# Patient Record
Sex: MALE | Race: WHITE | NOT HISPANIC OR LATINO
[De-identification: names, ages, dates, MRNs, and addresses within clinical notes are randomized per-mention and may not be internally consistent; named-entity substitution may affect disease eponyms.]

---

## 2020-12-21 ENCOUNTER — APPOINTMENT (OUTPATIENT)
Dept: SURGERY | Facility: CLINIC | Age: 70
End: 2020-12-21
Payer: COMMERCIAL

## 2020-12-21 VITALS
BODY MASS INDEX: 31.84 KG/M2 | SYSTOLIC BLOOD PRESSURE: 130 MMHG | TEMPERATURE: 98 F | HEART RATE: 78 BPM | HEIGHT: 69 IN | OXYGEN SATURATION: 99 % | DIASTOLIC BLOOD PRESSURE: 80 MMHG | WEIGHT: 215 LBS

## 2020-12-21 DIAGNOSIS — Z78.9 OTHER SPECIFIED HEALTH STATUS: ICD-10-CM

## 2020-12-21 DIAGNOSIS — Z85.528 PERSONAL HISTORY OF OTHER MALIGNANT NEOPLASM OF KIDNEY: ICD-10-CM

## 2020-12-21 PROBLEM — Z00.00 ENCOUNTER FOR PREVENTIVE HEALTH EXAMINATION: Status: ACTIVE | Noted: 2020-12-21

## 2020-12-21 PROCEDURE — 99203 OFFICE O/P NEW LOW 30 MIN: CPT

## 2020-12-21 NOTE — CONSULT LETTER
[Dear  ___] : Dear  [unfilled], [Consult Letter:] : I had the pleasure of evaluating your patient, [unfilled]. [Please see my note below.] : Please see my note below. [Consult Closing:] : Thank you very much for allowing me to participate in the care of this patient.  If you have any questions, please do not hesitate to contact me. [Sincerely,] : Sincerely, [FreeTextEntry3] : Rylan Gomez MD, FACS

## 2020-12-21 NOTE — PLAN
[FreeTextEntry1] : Mr. PERDOMO  was told significance of findings, options, risks and benefits were explained.  .  All surgical options were discussed including non-surgical treatments.  patient was advised to follow up with his surgeon . \par Patient advised to seek immediate medical attention with any acute change in symptoms or with the development of any new or worsening symptoms.  Patient's questions and concerns addressed to patient's satisfaction, and patient verbalized an understanding of the information discussed.\par \par

## 2020-12-21 NOTE — ASSESSMENT
[FreeTextEntry1] : Impression: right inguinal hernia   No evidence of hernia  recurrence.     right testicle  positioned   in his upper part of the scrotum

## 2020-12-21 NOTE — HISTORY OF PRESENT ILLNESS
[de-identified] : Mr. NIKO PERDOMO is  a 70 year old male who was referred by Dr. Rajiv Sullivan with the chief complaint of having pain   in his Right  groin.  he is here for a second opinion.  Patient states he had a RIH repair on December 2 and since than his testicle is in his upper part of the scrotum .     He denies any fever or  night sweats. Appetite is good and weight is stable.  no problems with urination. Patient reports good bowel movements and appetite. \par  \par  \par

## 2020-12-21 NOTE — PHYSICAL EXAM
[Alert] : alert [Oriented to Person] : oriented to person [Oriented to Place] : oriented to place [Oriented to Time] : oriented to time [Calm] : calm [de-identified] : He  is alert, well-groomed, and cheerful.\par   [de-identified] : anicteric.  Nasal mucosa pink, septum midline. Oral mucosa pink.  Tongue midline, Pharynx without exudates.\par   [de-identified] : Neck supple. Trachea midline. Thyroid isthmus barely palpable, lobes not felt.\par   [de-identified] :   No evidence of hernia  recurrence. wound well healed.   No penile discharge or lesions.  No scrotal swelling or discoloration. Testes descended on the left side and  right testicle  positioned   in his upper part of the scrotum  , smooth, without masses. Epididymis non-tender.

## 2023-12-11 ENCOUNTER — APPOINTMENT (OUTPATIENT)
Dept: SURGERY | Facility: CLINIC | Age: 73
End: 2023-12-11
Payer: MEDICARE

## 2023-12-11 VITALS
BODY MASS INDEX: 32.5 KG/M2 | SYSTOLIC BLOOD PRESSURE: 128 MMHG | HEIGHT: 67.72 IN | HEART RATE: 76 BPM | WEIGHT: 212 LBS | OXYGEN SATURATION: 94 % | DIASTOLIC BLOOD PRESSURE: 72 MMHG

## 2023-12-11 DIAGNOSIS — Z87.891 PERSONAL HISTORY OF NICOTINE DEPENDENCE: ICD-10-CM

## 2023-12-11 DIAGNOSIS — I10 ESSENTIAL (PRIMARY) HYPERTENSION: ICD-10-CM

## 2023-12-11 DIAGNOSIS — Z95.5 PRESENCE OF CORONARY ANGIOPLASTY IMPLANT AND GRAFT: ICD-10-CM

## 2023-12-11 DIAGNOSIS — Z85.118 PERSONAL HISTORY OF OTHER MALIGNANT NEOPLASM OF BRONCHUS AND LUNG: ICD-10-CM

## 2023-12-11 DIAGNOSIS — M79.89 OTHER SPECIFIED SOFT TISSUE DISORDERS: ICD-10-CM

## 2023-12-11 PROCEDURE — 99214 OFFICE O/P EST MOD 30 MIN: CPT

## 2023-12-11 RX ORDER — ESCITALOPRAM OXALATE 10 MG/1
10 TABLET ORAL
Qty: 60 | Refills: 0 | Status: ACTIVE | COMMUNITY
Start: 2023-12-02

## 2023-12-11 RX ORDER — MEMANTINE HYDROCHLORIDE AND DONEPEZIL HYDROCHLORIDE 14; 10 MG/1; MG/1
14-10 CAPSULE ORAL
Qty: 30 | Refills: 0 | Status: ACTIVE | COMMUNITY
Start: 2023-10-19

## 2023-12-11 RX ORDER — ATORVASTATIN CALCIUM 80 MG/1
80 TABLET, FILM COATED ORAL
Qty: 90 | Refills: 0 | Status: ACTIVE | COMMUNITY
Start: 2023-10-19

## 2023-12-11 RX ORDER — ASPIRIN 325 MG/1
TABLET, FILM COATED ORAL
Refills: 0 | Status: ACTIVE | COMMUNITY

## 2023-12-11 RX ORDER — PANTOPRAZOLE 40 MG/1
40 TABLET, DELAYED RELEASE ORAL
Qty: 90 | Refills: 0 | Status: ACTIVE | COMMUNITY
Start: 2023-10-19

## 2023-12-11 RX ORDER — ZOLPIDEM TARTRATE 10 MG/1
10 TABLET ORAL
Qty: 30 | Refills: 0 | Status: ACTIVE | COMMUNITY
Start: 2023-05-10

## 2023-12-11 RX ORDER — CIPROFLOXACIN HYDROCHLORIDE 500 MG/1
500 TABLET, FILM COATED ORAL
Qty: 28 | Refills: 0 | Status: ACTIVE | COMMUNITY
Start: 2023-10-04

## 2023-12-11 RX ORDER — ISOSORBIDE MONONITRATE 30 MG/1
30 TABLET, EXTENDED RELEASE ORAL
Qty: 180 | Refills: 0 | Status: ACTIVE | COMMUNITY
Start: 2023-06-20

## 2023-12-11 RX ORDER — POLYETHYLENE GLYCOL 3350 17 G/17G
17 POWDER, FOR SOLUTION ORAL
Qty: 238 | Refills: 0 | Status: ACTIVE | COMMUNITY
Start: 2023-07-26

## 2023-12-11 RX ORDER — LEVOTHYROXINE SODIUM 0.03 MG/1
25 TABLET ORAL
Qty: 30 | Refills: 0 | Status: ACTIVE | COMMUNITY
Start: 2023-06-12

## 2023-12-11 RX ORDER — BENZONATATE 200 MG/1
200 CAPSULE ORAL
Qty: 30 | Refills: 0 | Status: ACTIVE | COMMUNITY
Start: 2023-12-06

## 2023-12-11 RX ORDER — PANCRELIPASE 120000; 24000; 76000 [USP'U]/1; [USP'U]/1; [USP'U]/1
24000-76000 CAPSULE, DELAYED RELEASE PELLETS ORAL
Qty: 90 | Refills: 0 | Status: ACTIVE | COMMUNITY
Start: 2023-11-13

## 2023-12-11 RX ORDER — LINACLOTIDE 145 UG/1
145 CAPSULE, GELATIN COATED ORAL
Qty: 30 | Refills: 0 | Status: ACTIVE | COMMUNITY
Start: 2023-09-12

## 2023-12-11 RX ORDER — TAMSULOSIN HYDROCHLORIDE 0.4 MG/1
0.4 CAPSULE ORAL
Qty: 30 | Refills: 0 | Status: ACTIVE | COMMUNITY
Start: 2023-12-02

## 2023-12-11 RX ORDER — METOPROLOL TARTRATE 25 MG/1
25 TABLET, FILM COATED ORAL
Qty: 90 | Refills: 0 | Status: ACTIVE | COMMUNITY
Start: 2023-10-19

## 2023-12-11 RX ORDER — CLOPIDOGREL BISULFATE 75 MG/1
75 TABLET, FILM COATED ORAL
Qty: 90 | Refills: 0 | Status: ACTIVE | COMMUNITY
Start: 2023-10-19

## 2023-12-11 RX ORDER — VIBEGRON 75 MG/1
75 TABLET, FILM COATED ORAL
Qty: 14 | Refills: 0 | Status: ACTIVE | COMMUNITY
Start: 2023-10-04

## 2024-01-18 ENCOUNTER — RESULT REVIEW (OUTPATIENT)
Age: 74
End: 2024-01-18

## 2024-01-18 ENCOUNTER — OUTPATIENT (OUTPATIENT)
Dept: OUTPATIENT SERVICES | Facility: HOSPITAL | Age: 74
LOS: 1 days | End: 2024-01-18
Payer: MEDICARE

## 2024-01-18 VITALS
TEMPERATURE: 98 F | HEART RATE: 75 BPM | SYSTOLIC BLOOD PRESSURE: 123 MMHG | WEIGHT: 209 LBS | HEIGHT: 68 IN | DIASTOLIC BLOOD PRESSURE: 65 MMHG | OXYGEN SATURATION: 98 % | RESPIRATION RATE: 18 BRPM

## 2024-01-18 DIAGNOSIS — Z95.5 PRESENCE OF CORONARY ANGIOPLASTY IMPLANT AND GRAFT: ICD-10-CM

## 2024-01-18 DIAGNOSIS — E78.5 HYPERLIPIDEMIA, UNSPECIFIED: ICD-10-CM

## 2024-01-18 DIAGNOSIS — F03.90 UNSPECIFIED DEMENTIA WITHOUT BEHAVIORAL DISTURBANCE: ICD-10-CM

## 2024-01-18 DIAGNOSIS — R06.02 SHORTNESS OF BREATH: ICD-10-CM

## 2024-01-18 DIAGNOSIS — K40.90 UNILATERAL INGUINAL HERNIA, WITHOUT OBSTRUCTION OR GANGRENE, NOT SPECIFIED AS RECURRENT: ICD-10-CM

## 2024-01-18 DIAGNOSIS — Z90.5 ACQUIRED ABSENCE OF KIDNEY: Chronic | ICD-10-CM

## 2024-01-18 DIAGNOSIS — Z01.818 ENCOUNTER FOR OTHER PREPROCEDURAL EXAMINATION: ICD-10-CM

## 2024-01-18 DIAGNOSIS — Z98.890 OTHER SPECIFIED POSTPROCEDURAL STATES: Chronic | ICD-10-CM

## 2024-01-18 DIAGNOSIS — K21.9 GASTRO-ESOPHAGEAL REFLUX DISEASE WITHOUT ESOPHAGITIS: ICD-10-CM

## 2024-01-18 DIAGNOSIS — I10 ESSENTIAL (PRIMARY) HYPERTENSION: ICD-10-CM

## 2024-01-18 PROCEDURE — 71046 X-RAY EXAM CHEST 2 VIEWS: CPT

## 2024-01-18 PROCEDURE — G0463: CPT

## 2024-01-18 PROCEDURE — 71046 X-RAY EXAM CHEST 2 VIEWS: CPT | Mod: 26

## 2024-01-18 RX ORDER — ALPRAZOLAM 0.25 MG
1 TABLET ORAL
Refills: 0 | DISCHARGE

## 2024-01-18 NOTE — H&P PST ADULT - PSYCHIATRIC COMMENTS
early dementia h/o early dementia (alert and oriented x 3), memory adequate for this encounter h/o early alzheimer's/dementia (alert and oriented x 3), memory adequate for this encounter

## 2024-01-18 NOTE — H&P PST ADULT - PROBLEM SELECTOR PLAN 7
Scheduled for left inguinal hernia repair with mesh on 1/26/24  Preoperative instructions discussed and given to patient.   Patient agrees to follow up with surgeon's office for instructions prior to surgery   Discussed preprocedure skin preparation using  chlorhexidine gluconate 4% solution three days prior to  surgery - including the day of surgery  Instructed patient to avoid aspirin and aspirin products, over the counter medications such as vitamins and herbal medications, one week prior to surgery.  Take Tylenol as needed for pain  Follow up with PCP postoperatively for management of chronic conditions  Patient verbalized understanding of instructions

## 2024-01-18 NOTE — H&P PST ADULT - PROBLEM SELECTOR PLAN 2
Take antihypertensive medication metoprolol and isosorbide the morning of surgery with a sip of water  Follow up with PCP postoperatively for management of hypertension

## 2024-01-18 NOTE — H&P PST ADULT - PROBLEM SELECTOR PLAN 1
Continue aspirin as prescribed  Take medication the morning of surgery with a sip of water Continue aspirin as prescribed  Take medication the morning of surgery with a sip of water  Hold Plavix per recommendation from cardiologist

## 2024-01-18 NOTE — H&P PST ADULT - PROBLEM SELECTOR PLAN 6
Follow up with scheduled appt 1/22/24 with cardiologist for optimization prior to surgery Personal history of left kidney cancer with b/l lung mets  Follow up with scheduled appt 1/22/24 with cardiologist for optimization prior to surgery  CXR here today  Last saw pulmonologist 10/2023 - notes pending in PST

## 2024-01-18 NOTE — H&P PST ADULT - ASSESSMENT
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  States he had right inguinal hernia repair 2020. He is diagnosed with unilateral inguinal hernia without obstruction or gangrene, not recurrent. 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   STOP  BANG SCORE IS 4

## 2024-01-18 NOTE — H&P PST ADULT - DOES PATIENT HAVE ADVANCE DIRECTIVE
No Call [facility] [phone] in case of emergency/No Call  brother, [place] [phone] in case of emergency/No

## 2024-01-18 NOTE — H&P PST ADULT - GASTROINTESTINAL
soft/nontender/nondistended/normal active bowel sounds/no guarding/no rigidity/no organomegaly/no palpable nabila/no masses palpable negative

## 2024-01-18 NOTE — H&P PST ADULT - PRIMARY CARE PROVIDER
Dr Villavicencio   Telephone [phone]  Cardiologist Dr. Bullard [phone] Pulmonologist Dr. Avinash Banda   Telephone Dr Villavicencio   Telephone [phone]  Cardiologist Dr. Bullard [phone] Pulmonologist Dr. Avinash Banda   Telephone [phone]

## 2024-01-18 NOTE — H&P PST ADULT - RS GEN HX ROS MEA POS PC
new onset stridor/dyspnea/stidor new onset stridor on symbicort/dyspnea/stidor stridor on symbicort/dyspnea/stidor

## 2024-01-18 NOTE — H&P PST ADULT - HISTORY OF PRESENT ILLNESS
74 y/o male with PMH of       is diagnosed with unilateral inguinal hernia without obstruction or gangrene, not recurrent. 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  States he had right inguinal hernia repair 2020. He is diagnosed with unilateral inguinal hernia without obstruction or gangrene, not recurrent. He is scheduled for left inguinal hernia repair with mesh on 1/26/24 72 y/o male with PMH of hypertension, old MI 2/1/23 s/p insertion of coronary stents x 2, dyspnea on exertion after 50 steps per patient,  left kidney cancer with bilateral lung metastasis, s/p left nephrectomy (unable to recall year) complains of left femoral swelling aggravated by walking and standing for long hours.  States he had right inguinal hernia repair 2020, which is still painful. He is diagnosed with unilateral inguinal hernia without obstruction or gangrene, not recurrent. He is scheduled for left inguinal hernia repair with mesh on 1/26/24

## 2024-01-18 NOTE — H&P PST ADULT - NSICDXPASTMEDICALHX_GEN_ALL_CORE_FT
PAST MEDICAL HISTORY:  Anxiety disorder     Cancer of left kidney     Cancer of lung     Dementia     Dyspnea on exertion     GERD (gastroesophageal reflux disease)     H/O insomnia     H/O thyroid disease     History of vertigo     Hypertension     Insomnia     Stented coronary artery      PAST MEDICAL HISTORY:  Anxiety and depression     Cancer of left kidney     Cancer of lung     Dementia     Dyspnea on exertion     GERD (gastroesophageal reflux disease)     H/O insomnia     H/O thyroid disease     History of vertigo     Hypertension     Insomnia     Stented coronary artery

## 2024-01-25 ENCOUNTER — TRANSCRIPTION ENCOUNTER (OUTPATIENT)
Age: 74
End: 2024-01-25

## 2024-01-26 ENCOUNTER — APPOINTMENT (OUTPATIENT)
Dept: SURGERY | Facility: HOSPITAL | Age: 74
End: 2024-01-26
Payer: MEDICARE

## 2024-01-26 ENCOUNTER — TRANSCRIPTION ENCOUNTER (OUTPATIENT)
Age: 74
End: 2024-01-26

## 2024-01-26 ENCOUNTER — OUTPATIENT (OUTPATIENT)
Dept: OUTPATIENT SERVICES | Facility: HOSPITAL | Age: 74
LOS: 1 days | End: 2024-01-26
Payer: MEDICARE

## 2024-01-26 VITALS
HEIGHT: 68 IN | HEART RATE: 83 BPM | OXYGEN SATURATION: 95 % | DIASTOLIC BLOOD PRESSURE: 73 MMHG | RESPIRATION RATE: 16 BRPM | TEMPERATURE: 98 F | SYSTOLIC BLOOD PRESSURE: 127 MMHG | WEIGHT: 209 LBS

## 2024-01-26 VITALS
DIASTOLIC BLOOD PRESSURE: 68 MMHG | OXYGEN SATURATION: 95 % | SYSTOLIC BLOOD PRESSURE: 125 MMHG | RESPIRATION RATE: 16 BRPM | TEMPERATURE: 98 F | HEART RATE: 74 BPM

## 2024-01-26 DIAGNOSIS — Z98.890 OTHER SPECIFIED POSTPROCEDURAL STATES: Chronic | ICD-10-CM

## 2024-01-26 DIAGNOSIS — K40.90 UNILATERAL INGUINAL HERNIA, WITHOUT OBSTRUCTION OR GANGRENE, NOT SPECIFIED AS RECURRENT: ICD-10-CM

## 2024-01-26 DIAGNOSIS — Z90.5 ACQUIRED ABSENCE OF KIDNEY: Chronic | ICD-10-CM

## 2024-01-26 LAB
ANION GAP SERPL CALC-SCNC: 5 MMOL/L — SIGNIFICANT CHANGE UP (ref 5–17)
BUN SERPL-MCNC: 22 MG/DL — HIGH (ref 7–18)
CALCIUM SERPL-MCNC: 9.5 MG/DL — SIGNIFICANT CHANGE UP (ref 8.4–10.5)
CHLORIDE SERPL-SCNC: 107 MMOL/L — SIGNIFICANT CHANGE UP (ref 96–108)
CO2 SERPL-SCNC: 25 MMOL/L — SIGNIFICANT CHANGE UP (ref 22–31)
CREAT SERPL-MCNC: 1.72 MG/DL — HIGH (ref 0.5–1.3)
EGFR: 41 ML/MIN/1.73M2 — LOW
GLUCOSE SERPL-MCNC: 136 MG/DL — HIGH (ref 70–99)
HCT VFR BLD CALC: 39.8 % — SIGNIFICANT CHANGE UP (ref 39–50)
HGB BLD-MCNC: 13.7 G/DL — SIGNIFICANT CHANGE UP (ref 13–17)
MCHC RBC-ENTMCNC: 28.7 PG — SIGNIFICANT CHANGE UP (ref 27–34)
MCHC RBC-ENTMCNC: 34.4 GM/DL — SIGNIFICANT CHANGE UP (ref 32–36)
MCV RBC AUTO: 83.3 FL — SIGNIFICANT CHANGE UP (ref 80–100)
NRBC # BLD: 0 /100 WBCS — SIGNIFICANT CHANGE UP (ref 0–0)
PLATELET # BLD AUTO: 143 K/UL — LOW (ref 150–400)
POTASSIUM SERPL-MCNC: 4.3 MMOL/L — SIGNIFICANT CHANGE UP (ref 3.5–5.3)
POTASSIUM SERPL-SCNC: 4.3 MMOL/L — SIGNIFICANT CHANGE UP (ref 3.5–5.3)
RBC # BLD: 4.78 M/UL — SIGNIFICANT CHANGE UP (ref 4.2–5.8)
RBC # FLD: 13.8 % — SIGNIFICANT CHANGE UP (ref 10.3–14.5)
SODIUM SERPL-SCNC: 137 MMOL/L — SIGNIFICANT CHANGE UP (ref 135–145)
WBC # BLD: 7.44 K/UL — SIGNIFICANT CHANGE UP (ref 3.8–10.5)
WBC # FLD AUTO: 7.44 K/UL — SIGNIFICANT CHANGE UP (ref 3.8–10.5)

## 2024-01-26 PROCEDURE — 85027 COMPLETE CBC AUTOMATED: CPT

## 2024-01-26 PROCEDURE — 49505 PRP I/HERN INIT REDUC >5 YR: CPT | Mod: LT

## 2024-01-26 PROCEDURE — C1781: CPT

## 2024-01-26 PROCEDURE — 80048 BASIC METABOLIC PNL TOTAL CA: CPT

## 2024-01-26 PROCEDURE — 49505 PRP I/HERN INIT REDUC >5 YR: CPT | Mod: AS,LT

## 2024-01-26 PROCEDURE — 36415 COLL VENOUS BLD VENIPUNCTURE: CPT

## 2024-01-26 DEVICE — MESH HERNIA MARLEX 3 X 6": Type: IMPLANTABLE DEVICE | Site: LEFT | Status: FUNCTIONAL

## 2024-01-26 RX ORDER — MEMANTINE HYDROCHLORIDE AND DONEPEZIL HYDROCHLORIDE 21; 10 MG/1; MG/1
1 CAPSULE ORAL
Refills: 0 | DISCHARGE

## 2024-01-26 RX ORDER — ISOSORBIDE DINITRATE 5 MG/1
1 TABLET ORAL
Refills: 0 | DISCHARGE

## 2024-01-26 RX ORDER — ATORVASTATIN CALCIUM 80 MG/1
1 TABLET, FILM COATED ORAL
Refills: 0 | DISCHARGE

## 2024-01-26 RX ORDER — SODIUM CHLORIDE 9 MG/ML
3 INJECTION INTRAMUSCULAR; INTRAVENOUS; SUBCUTANEOUS EVERY 8 HOURS
Refills: 0 | Status: DISCONTINUED | OUTPATIENT
Start: 2024-01-26 | End: 2024-01-26

## 2024-01-26 RX ORDER — ZOLPIDEM TARTRATE 10 MG/1
1 TABLET ORAL
Refills: 0 | DISCHARGE

## 2024-01-26 RX ORDER — ONDANSETRON 8 MG/1
4 TABLET, FILM COATED ORAL ONCE
Refills: 0 | Status: DISCONTINUED | OUTPATIENT
Start: 2024-01-26 | End: 2024-01-26

## 2024-01-26 RX ORDER — OXYCODONE HYDROCHLORIDE 5 MG/1
1 TABLET ORAL
Qty: 8 | Refills: 0
Start: 2024-01-26 | End: 2024-01-27

## 2024-01-26 RX ORDER — CLOPIDOGREL BISULFATE 75 MG/1
1 TABLET, FILM COATED ORAL
Qty: 0 | Refills: 0 | DISCHARGE

## 2024-01-26 RX ORDER — METOPROLOL TARTRATE 50 MG
1 TABLET ORAL
Refills: 0 | DISCHARGE

## 2024-01-26 RX ORDER — FENTANYL CITRATE 50 UG/ML
50 INJECTION INTRAVENOUS
Refills: 0 | Status: DISCONTINUED | OUTPATIENT
Start: 2024-01-26 | End: 2024-01-26

## 2024-01-26 RX ORDER — SODIUM CHLORIDE 9 MG/ML
1000 INJECTION, SOLUTION INTRAVENOUS
Refills: 0 | Status: DISCONTINUED | OUTPATIENT
Start: 2024-01-26 | End: 2024-01-26

## 2024-01-26 RX ORDER — ASPIRIN/CALCIUM CARB/MAGNESIUM 324 MG
0 TABLET ORAL
Refills: 0 | DISCHARGE

## 2024-01-26 RX ORDER — BUDESONIDE AND FORMOTEROL FUMARATE DIHYDRATE 160; 4.5 UG/1; UG/1
2 AEROSOL RESPIRATORY (INHALATION)
Refills: 0 | DISCHARGE

## 2024-01-26 RX ORDER — MECLIZINE HCL 12.5 MG
1 TABLET ORAL
Refills: 0 | DISCHARGE

## 2024-01-26 RX ORDER — PANTOPRAZOLE SODIUM 20 MG/1
1 TABLET, DELAYED RELEASE ORAL
Refills: 0 | DISCHARGE

## 2024-01-26 RX ORDER — CITALOPRAM 10 MG/1
1 TABLET, FILM COATED ORAL
Refills: 0 | DISCHARGE

## 2024-01-26 RX ORDER — FENTANYL CITRATE 50 UG/ML
25 INJECTION INTRAVENOUS
Refills: 0 | Status: DISCONTINUED | OUTPATIENT
Start: 2024-01-26 | End: 2024-01-26

## 2024-01-26 NOTE — ASU DISCHARGE PLAN (ADULT/PEDIATRIC) - CARE PROVIDER_API CALL
Rylan Gomez  Surgery  [address], [address]  [place] [address]  Phone: [phone]  Fax: [phone]  Follow Up Time:

## 2024-01-26 NOTE — ASU PREOP CHECKLIST - ALLERGY BAND ON
Reminder Letter/My Chart sent     Leida Gray -   [facility] Pap Tracking     no known allergies

## 2024-01-26 NOTE — ASU PATIENT PROFILE, ADULT - NSICDXPASTMEDICALHX_GEN_ALL_CORE_FT
PAST MEDICAL HISTORY:  Anxiety and depression     Cancer of left kidney     Cancer of lung     Dementia     Dyspnea on exertion     GERD (gastroesophageal reflux disease)     H/O insomnia     H/O thyroid disease     History of vertigo     Hypertension     Insomnia     Stented coronary artery

## 2024-01-26 NOTE — BRIEF OPERATIVE NOTE - NSICDXBRIEFPROCEDURE_GEN_ALL_CORE_FT
PROCEDURES:  Repair, hernia, inguinal, open, using mesh, adult 26-Jan-2024 11:59:21  Batool Willard

## 2024-01-26 NOTE — ASU DISCHARGE PLAN (ADULT/PEDIATRIC) - ASU DC SPECIAL INSTRUCTIONSFT
Please follow-up with your surgeon in 1 week. Drink plenty of fluids and rest as needed. Call for any fever over 101, nausea, vomiting, severe pain, no passing of gas or bowel movement.       DIET: You may resume your regular diet as normal.       SURGICAL SITES : Remove outer dressing and keep white steri-strips in place allowing them to fall off on their own. You may shower 48 hours post-operatively but do not bathe or soak in the water for 1-2 weeks; pat dry. If you notice any signs of surgical site infection (ie. redness, swelling, pain, pus drainage), please seek medical care immediately.       ACTIVITY : Do not lift anything heavier than 10 pounds for 2 weeks and avoid strenuous activity for 4-6 weeks.       PAIN CONTROL: You may take Motrin 600mg-800mg (with food) every 6 hours or Tylenol 650mg-1000mg every 6 hours as needed for mild pain. Stagger one medication 3 hours after the other for maximum pain control. Maximum daily dose of Tylenol should not exceed 4000mg/day.  You may take your prescribed oxycodone for severe breakthrough pain not that is not relieved by Tylenol/Motrin. Do not drive or make important decisions while taking this medication and do not take more than 4 pills in 24 hours. Please follow-up with your surgeon in 1 week. Drink plenty of fluids and rest as needed. Call for any fever over 101, nausea, vomiting, severe pain, no passing of gas or bowel movement.       DIET: You may resume your regular diet as normal.       SURGICAL SITES : Remove outer dressing and keep white steri-strips in place allowing them to fall off on their own. You may shower 48 hours post-operatively but do not bathe or soak in the water for 1-2 weeks; pat dry. If you notice any signs of surgical site infection (ie. redness, swelling, pain, pus drainage), please seek medical care immediately.       ACTIVITY : Do not lift anything heavier than 10 pounds for 2 weeks and avoid strenuous activity for 4-6 weeks.       PAIN CONTROL: You may take Motrin 600mg-800mg (with food) every 6 hours or Tylenol 650mg-1000mg every 6 hours as needed for mild pain. Stagger one medication 3 hours after the other for maximum pain control. Maximum daily dose of Tylenol should not exceed 4000mg/day.  You may take your prescribed oxycodone for severe breakthrough pain not that is not relieved by Tylenol/Motrin. Do not drive or make important decisions while taking this medication and do not take more than 4 pills in 24 hours.      You may restart your PLAVIX tomorrow 1/27/24.

## 2024-01-27 ENCOUNTER — EMERGENCY (EMERGENCY)
Facility: HOSPITAL | Age: 74
LOS: 1 days | Discharge: HOME | End: 2024-01-27
Attending: EMERGENCY MEDICINE
Payer: MEDICARE

## 2024-01-27 VITALS
WEIGHT: 209 LBS | RESPIRATION RATE: 16 BRPM | OXYGEN SATURATION: 95 % | DIASTOLIC BLOOD PRESSURE: 69 MMHG | HEART RATE: 79 BPM | HEIGHT: 68 IN | TEMPERATURE: 98 F | SYSTOLIC BLOOD PRESSURE: 131 MMHG

## 2024-01-27 DIAGNOSIS — Z90.5 ACQUIRED ABSENCE OF KIDNEY: Chronic | ICD-10-CM

## 2024-01-27 DIAGNOSIS — Z98.890 OTHER SPECIFIED POSTPROCEDURAL STATES: Chronic | ICD-10-CM

## 2024-01-27 PROCEDURE — 99284 EMERGENCY DEPT VISIT MOD MDM: CPT

## 2024-01-27 NOTE — CONSULT NOTE ADULT - SUBJECTIVE AND OBJECTIVE BOX
GENERAL SURGERY CONSULTATION NOTE  73M s/p left inguinal hernia repair on 1/26 POD 1 c/o left scrotal swelling. Surgical team consulted for evaluation s/p surgical procedure. The patient was seen and examined at bedside. The patient states about 2-3pm today he noticed some scrotal and penile shaft swelling as well as some ecchymosis of the scrotum as well. He states the swelling has been increasing over the course of the day. The patient has had some incisional pain but did not take any pain medications due to hx of nephrectomy. The patient denies any active bleeding from surgical site, nausea/vomiting, fever or chills. The patient states he had difficulty urinating earlier, but reports he just urinated in the ED. No additional complaints.      PAST MEDICAL & SURGICAL HISTORY:  Cancer of left kidney      Cancer of lung      Hypertension      Stented coronary artery      History of vertigo      H/O thyroid disease      H/O insomnia      Dementia      GERD (gastroesophageal reflux disease)      Dyspnea on exertion      Insomnia      Anxiety and depression      H/O hernia repair      History of left nephrectomy          MEDICATIONS  (STANDING):    MEDICATIONS  (PRN):      Allergies    No Known Allergies    Intolerances        SOCIAL HISTORY:    FAMILY HISTORY:  FH: type 2 diabetes (Father)        Vital Signs Last 24 Hrs  T(C): 36.8 (27 Jan 2024 19:47), Max: 36.8 (27 Jan 2024 19:47)  T(F): 98.2 (27 Jan 2024 19:47), Max: 98.2 (27 Jan 2024 19:47)  HR: 79 (27 Jan 2024 19:47) (79 - 79)  BP: 131/69 (27 Jan 2024 19:47) (131/69 - 131/69)  BP(mean): --  RR: 16 (27 Jan 2024 19:47) (16 - 16)  SpO2: 95% (27 Jan 2024 19:47) (95% - 95%)    Parameters below as of 27 Jan 2024 19:47  Patient On (Oxygen Delivery Method): room air        I&O's Summary    T(C): 36.8 (01-27-24 @ 19:47), Max: 36.8 (01-27-24 @ 19:47)  HR: 79 (01-27-24 @ 19:47) (79 - 79)  BP: 131/69 (01-27-24 @ 19:47) (131/69 - 131/69)  RR: 16 (01-27-24 @ 19:47) (16 - 16)  SpO2: 95% (01-27-24 @ 19:47) (95% - 95%)    CONSTITUTIONAL: Well groomed, no apparent distress  RESP: No respiratory distress, no use of accessory muscles  GI: Soft, NT, ND, no rebound, no guarding; no palpable masses; no hepatosplenomegaly; no hernia palpated.   Left inguinal incision without evidence of active bleeding, expected incisional tenderness. New dressing of 4x4 Tegaderm applied in ED.  : Left scrotal edema, penile shaft edema with ecchymosis. Voiding without issue.   PSYCH: Appropriate insight/judgment; A+O x 3, mood and affect appropriate, recent/remote memory intact    LABS:                        13.7   7.44  )-----------( 143      ( 26 Jan 2024 09:50 )             39.8     01-26    137  |  107  |  22<H>  ----------------------------<  136<H>  4.3   |  25  |  1.72<H>    Ca    9.5      26 Jan 2024 09:50        Urinalysis Basic - ( 26 Jan 2024 09:50 )    Color: x / Appearance: x / SG: x / pH: x  Gluc: 136 mg/dL / Ketone: x  / Bili: x / Urobili: x   Blood: x / Protein: x / Nitrite: x   Leuk Esterase: x / RBC: x / WBC x   Sq Epi: x / Non Sq Epi: x / Bacteria: x      CAPILLARY BLOOD GLUCOSE            Cultures:      RADIOLOGY & ADDITIONAL STUDIES:

## 2024-01-27 NOTE — ED PROVIDER NOTE - GASTROINTESTINAL, MLM
Abdomen soft, left medial groin- surgical dsg intact, d/c. minimal-tender, no guarding.  lower medial groin hematoma

## 2024-01-27 NOTE — ED ADULT NURSE NOTE - NSFALLUNIVINTERV_ED_ALL_ED
Bed/Stretcher in lowest position, wheels locked, appropriate side rails in place/Call bell, personal items and telephone in reach/Instruct patient to call for assistance before getting out of bed/chair/stretcher/Non-slip footwear applied when patient is off stretcher/[place] to call system/Physically safe environment - no spills, clutter or unnecessary equipment/Purposeful proactive rounding/Room/bathroom lighting operational, light cord in reach

## 2024-01-27 NOTE — CONSULT NOTE ADULT - ASSESSMENT
73M s/p left inguinal hernia repair 1/26 POD #1   expected scrotal swelling, no evidence of active bleeding      Plan:  -no surgical intervention  -pt instructed to ice the surgical area  -f/u with Dr. Gomez in the office  -Discussed with Dr. Gomez

## 2024-01-27 NOTE — ED PROVIDER NOTE - PATIENT PORTAL LINK FT
You can access the FollowMyHealth Patient Portal offered by [facility] by registering at the following website: [email or web address]. By joining [employer]’s FollowMyHealth portal, you will also be able to view your health information using other applications (apps) compatible with our system.

## 2024-01-27 NOTE — ED PROVIDER NOTE - OBJECTIVE STATEMENT
72 y/o male with PMH of hypertension, old MI 2/1/23 s/p insertion of coronary stents x 2,  left kidney cancer with bilateral lung metastasis, s/p left nephrectomy (unable to recall year), right inguinal hernia repair 2020 and left inguinal hernia repair with mesh on 1/26/24 with Dr Gomez presents to ed c/o inability to urinate since 6p with gradual scrotal swelling and hematoma. no trauma. had BM earlier today. no straining. no fever, no nv. some pain at left groin.

## 2024-01-27 NOTE — ED PROVIDER NOTE - PROGRESS NOTE DETAILS
Morton: pt with post void urine bladder scan 139ml. pending surgery eval Morton: Surgery  evaluated patient and surgically cleared by his to apply ice to the area and avoid straining or any heavy lifting.

## 2024-01-27 NOTE — ED PROVIDER NOTE - CLINICAL SUMMARY MEDICAL DECISION MAKING FREE TEXT BOX
74 y/o male with PMH of hypertension, old MI 2/1/23 s/p insertion of coronary stents x 2,  left kidney cancer with bilateral lung metastasis, s/p left nephrectomy (unable to recall year), right inguinal hernia repair 2020 and left inguinal hernia repair with mesh on 1/26/24 with Dr Gomez presents to ed c/o inability to urinate since 6p with gradual scrotal swelling and hematoma. no trauma. had BM earlier today. no straining. no fever, no nv. some pain at left groin.    spoke with surgery pa and will see. post void bladder scan. possibly due to anesthesia causing bladder retention. no scrotal or penile pain. could be normal post hernia repair.

## 2024-01-28 PROBLEM — R06.09 OTHER FORMS OF DYSPNEA: Chronic | Status: ACTIVE | Noted: 2024-01-18

## 2024-01-28 PROBLEM — F41.9 ANXIETY DISORDER, UNSPECIFIED: Chronic | Status: ACTIVE | Noted: 2024-01-18

## 2024-01-28 PROBLEM — G47.00 INSOMNIA, UNSPECIFIED: Chronic | Status: ACTIVE | Noted: 2024-01-18

## 2024-01-28 PROBLEM — I10 ESSENTIAL (PRIMARY) HYPERTENSION: Chronic | Status: ACTIVE | Noted: 2024-01-18

## 2024-01-28 PROBLEM — F03.90 UNSPECIFIED DEMENTIA WITHOUT BEHAVIORAL DISTURBANCE: Chronic | Status: ACTIVE | Noted: 2024-01-18

## 2024-01-28 PROBLEM — Z87.898 PERSONAL HISTORY OF OTHER SPECIFIED CONDITIONS: Chronic | Status: ACTIVE | Noted: 2024-01-18

## 2024-01-28 PROBLEM — C34.90 MALIGNANT NEOPLASM OF UNSPECIFIED PART OF UNSPECIFIED BRONCHUS OR LUNG: Chronic | Status: ACTIVE | Noted: 2024-01-18

## 2024-01-28 PROBLEM — K21.9 GASTRO-ESOPHAGEAL REFLUX DISEASE WITHOUT ESOPHAGITIS: Chronic | Status: ACTIVE | Noted: 2024-01-18

## 2024-01-28 PROBLEM — Z86.39 PERSONAL HISTORY OF OTHER ENDOCRINE, NUTRITIONAL AND METABOLIC DISEASE: Chronic | Status: ACTIVE | Noted: 2024-01-18

## 2024-01-28 PROBLEM — Z95.5 PRESENCE OF CORONARY ANGIOPLASTY IMPLANT AND GRAFT: Chronic | Status: ACTIVE | Noted: 2024-01-18

## 2024-01-28 PROBLEM — C64.2 MALIGNANT NEOPLASM OF LEFT KIDNEY, EXCEPT RENAL PELVIS: Chronic | Status: ACTIVE | Noted: 2024-01-18

## 2024-01-29 ENCOUNTER — APPOINTMENT (OUTPATIENT)
Dept: SURGERY | Facility: CLINIC | Age: 74
End: 2024-01-29
Payer: MEDICARE

## 2024-01-29 DIAGNOSIS — G89.18 OTHER ACUTE POSTPROCEDURAL PAIN: ICD-10-CM

## 2024-01-29 PROCEDURE — 99024 POSTOP FOLLOW-UP VISIT: CPT

## 2024-01-29 RX ORDER — OXYCODONE AND ACETAMINOPHEN 5; 325 MG/1; MG/1
5-325 TABLET ORAL
Qty: 30 | Refills: 0 | Status: ACTIVE | COMMUNITY
Start: 2024-01-29 | End: 1900-01-01

## 2024-02-01 ENCOUNTER — APPOINTMENT (OUTPATIENT)
Dept: SURGERY | Facility: CLINIC | Age: 74
End: 2024-02-01
Payer: MEDICARE

## 2024-02-01 DIAGNOSIS — K40.90 UNILATERAL INGUINAL HERNIA, W/OUT OBSTRUCTION OR GANGRENE, NOT SPECIFIED AS RECURRENT: ICD-10-CM

## 2024-02-01 PROCEDURE — 99024 POSTOP FOLLOW-UP VISIT: CPT

## 2024-02-05 ENCOUNTER — APPOINTMENT (OUTPATIENT)
Dept: SURGERY | Facility: CLINIC | Age: 74
End: 2024-02-05
Payer: MEDICARE

## 2024-02-05 PROBLEM — K40.90 INGUINAL HERNIA, RIGHT: Status: ACTIVE | Noted: 2020-12-21

## 2024-02-05 PROCEDURE — 99024 POSTOP FOLLOW-UP VISIT: CPT

## 2024-02-05 NOTE — HISTORY OF PRESENT ILLNESS
[de-identified] : Mr. PERDOMO  is s/p Left inguinal hernia repair 01/26/2024. he is presenting today for a follow up . He has scrotal  swelling and   ecchymosis . Today  Mr. PERDOMO offers no complaints. patient reports no fever or  chills. patient reports  discomfort in the scrotum.  His surgical incisions are healing well. No signs of inflammation, infection or exudate. patient reports good bowel movements and appetite.

## 2024-02-05 NOTE — ASSESSMENT
[FreeTextEntry1] : Mr. PERDOMO is doing well, with excellent post-operative recovery. All surgical incisions are healing well and as expected. There is no evidence of infection or complication, and he is progressing as expected. Post-operative wound care, activity, restrictions and precautions reinforced. he was advised to   apply Ice to the scrotum. Patient instructed to refrain from any heavy lifting greater than 10-15 pounds for at least 4-6 weeks post-operatively. Patient's questions and concerns addressed to patient's satisfaction.

## 2024-02-05 NOTE — PLAN
[FreeTextEntry1] : patient will follow up in 2 weeks or if needed. Warning signs, follow up, and restrictions were discussed with the patient.

## 2024-02-05 NOTE — PHYSICAL EXAM
[Alert] : alert [Oriented to Person] : oriented to person [Oriented to Place] : oriented to place [Oriented to Time] : oriented to time [Calm] : calm [de-identified] : He  is alert, well-groomed, and in NAD   [de-identified] : anicteric.  Nasal mucosa pink, septum midline. Oral mucosa pink.  Tongue midline, Pharynx without exudates.   [de-identified] : Neck supple. Trachea midline. Thyroid isthmus barely palpable, lobes not felt.   [de-identified] : slight scrotal  .  No penile discharge or lesions.  No scrotal swelling or discoloration. Testes descended bilaterally, smooth, without masses. Epididymis non-tender.  [de-identified] :  left back mass is  mobile, Firm,  Smooth, non-tender,   Well defined. deep. No palpable lymph nodes.   Mass size - 8 cm x  8 cm.

## 2024-02-05 NOTE — HISTORY OF PRESENT ILLNESS
[de-identified] : Mr. PERDOMO  is s/p Left inguinal hernia repair 01/26/2024. he is presenting today with a cc of having swelling and ecchymosis. Today  Mr. PERDOMO offers no complaints. patient reports no fever or  chills. patient reports occasional discomfort in the surgical area.  His surgical incisions are healing well. No signs of inflammation, infection or exudate. patient reports good bowel movements and appetite.  he has problem urinating. he is not taking tamsulosin and he is not taking his Plavix  because he is afraid.

## 2024-02-05 NOTE — PHYSICAL EXAM
[Alert] : alert [Oriented to Person] : oriented to person [Oriented to Place] : oriented to place [Oriented to Time] : oriented to time [Calm] : calm [de-identified] :  left back mass is  mobile, Firm,  Smooth, non-tender,   Well defined. deep. No palpable lymph nodes.   Mass size - 8 cm x  8 cm. [de-identified] : He  is alert, well-groomed, and in NAD   [de-identified] : anicteric.  Nasal mucosa pink, septum midline. Oral mucosa pink.  Tongue midline, Pharynx without exudates.   [de-identified] : Neck supple. Trachea midline. Thyroid isthmus barely palpable, lobes not felt.   [de-identified] : slight scrotal ecchymosis.  No penile discharge or lesions.  No scrotal swelling or discoloration. Testes descended bilaterally, smooth, without masses. Epididymis non-tender.

## 2024-02-05 NOTE — PLAN
[FreeTextEntry1] : patient will follow up in  next or if needed. Warning signs, follow up, and restrictions were discussed with the patient. use Ice for scrotal swelling Use briefs to support scrotum

## 2024-02-05 NOTE — PHYSICAL EXAM
[Alert] : alert [Oriented to Person] : oriented to person [Oriented to Place] : oriented to place [Oriented to Time] : oriented to time [Calm] : calm [de-identified] : He  is alert, well-groomed, and in NAD   [de-identified] : anicteric.  Nasal mucosa pink, septum midline. Oral mucosa pink.  Tongue midline, Pharynx without exudates.   [de-identified] : Neck supple. Trachea midline. Thyroid isthmus barely palpable, lobes not felt.   [de-identified] :  scrotal swelling with resolving ecchymosis .  No penile discharge or lesions.

## 2024-02-05 NOTE — ASSESSMENT
[FreeTextEntry1] : Mr. PERDOMO is doing well, with excellent post-operative recovery. All surgical incisions are healing well and as expected. There is no evidence of infection or complication, and he is progressing as expected.  Post-operative wound care, activity, restrictions and precautions reinforced. he was advised to restart Plavix and tamsulosin and apply Ice to the scrotum .  Patient instructed to refrain from any heavy lifting greater than 10-15 pounds for at least 4-6 weeks post-operatively. Patient's questions and concerns addressed to patient's satisfaction.

## 2024-02-05 NOTE — HISTORY OF PRESENT ILLNESS
[de-identified] : Mr. PERDOMO  is s/p Left inguinal hernia repair 01/26/2024. he is presenting today for a follow up . He has scrotal  swelling.  ecchymosis has resolved. Today  Mr. PERDOMO offers no complaints. patient reports no fever or  chills. patient reports  discomfort in the scrotum.  His surgical incisions are healing well. No signs of inflammation, infection or exudate. patient reports good bowel movements and appetite.

## 2024-02-05 NOTE — ASSESSMENT
[FreeTextEntry1] : Mr. PERDOMO is doing well, with excellent post-operative recovery. All surgical incisions are healing well and as expected. There is no evidence of infection or complication, and he is progressing as expected. Post-operative wound care, activity, restrictions and precautions reinforced. he was advised to restart Plavix and tamsulosin and apply Ice to the scrotum. Patient instructed to refrain from any heavy lifting greater than 10-15 pounds for at least 4-6 weeks post-operatively. Patient's questions and concerns addressed to patient's satisfaction.

## 2024-02-14 ENCOUNTER — NON-APPOINTMENT (OUTPATIENT)
Age: 74
End: 2024-02-14

## 2024-02-22 ENCOUNTER — APPOINTMENT (OUTPATIENT)
Dept: SURGERY | Facility: CLINIC | Age: 74
End: 2024-02-22
Payer: MEDICARE

## 2024-02-22 DIAGNOSIS — K40.90 UNILATERAL INGUINAL HERNIA, W/OUT OBSTRUCTION OR GANGRENE, NOT SPECIFIED AS RECURRENT: ICD-10-CM

## 2024-02-22 PROCEDURE — 99024 POSTOP FOLLOW-UP VISIT: CPT

## 2024-02-22 NOTE — PHYSICAL EXAM
[Alert] : alert [Oriented to Person] : oriented to person [Oriented to Place] : oriented to place [Oriented to Time] : oriented to time [Calm] : calm [de-identified] : He  is alert, well-groomed, and in NAD   [de-identified] : anicteric.  Nasal mucosa pink, septum midline. Oral mucosa pink.  Tongue midline, Pharynx without exudates.   [de-identified] :   Surgical wound is healing well.   no signs of  inflammation or infection.  [de-identified] : Neck supple. Trachea midline. Thyroid isthmus barely palpable, lobes not felt.

## 2024-02-22 NOTE — HISTORY OF PRESENT ILLNESS
[de-identified] : Mr. PERDOMO is s/p Left inguinal hernia repair 01/26/2024. Today  Mr. PERDOMO offers no complaints. patient reports no fever or  chills. patient reports occasional discomfort in the surgical area.  His surgical incisions are healing well. No signs of inflammation, infection or exudate. patient reports good bowel movements and appetite.

## 2024-02-22 NOTE — ASSESSMENT
[FreeTextEntry1] : Mr. PERDOMO is doing well, with excellent post-operative recovery. All surgical incisions are healing well and as expected. There is no evidence of infection or complication, and he is progressing as expected. Post-operative wound care, activity, restrictions and precautions reinforced. Patient instructed to refrain from any heavy lifting greater than 10-15 pounds for at least 4-6 weeks post-operatively. Patient's questions and concerns addressed to patient's satisfaction.

## 2024-05-23 ENCOUNTER — APPOINTMENT (OUTPATIENT)
Dept: SURGERY | Facility: CLINIC | Age: 74
End: 2024-05-23

## 2025-01-27 NOTE — ED ADULT TRIAGE NOTE - WEIGHT IN KG
The pt was in through the walk on on 01/24 for fatigue, fever, and nasal congestion, he was diagnosed with strep throat and was prescribed a z marianna, Tamiflu, and a medrol dosepack, the medication is causing the pt to have stomach pain, Mom called the pharmacy and they told her that he was prescribed a strong antibiotic and to call the doctor who prescribed it to see if it can be switched, Mom is calling to see if you can change the antibiotic, the pt uses [facility] on [address]    [address]

## 2025-03-24 ENCOUNTER — APPOINTMENT (OUTPATIENT)
Dept: SURGERY | Facility: CLINIC | Age: 75
End: 2025-03-24
Payer: MEDICARE

## 2025-03-24 ENCOUNTER — NON-APPOINTMENT (OUTPATIENT)
Age: 75
End: 2025-03-24

## 2025-03-24 VITALS
HEART RATE: 65 BPM | HEIGHT: 67 IN | OXYGEN SATURATION: 94 % | BODY MASS INDEX: 24.64 KG/M2 | WEIGHT: 157 LBS | SYSTOLIC BLOOD PRESSURE: 112 MMHG | DIASTOLIC BLOOD PRESSURE: 66 MMHG

## 2025-03-24 DIAGNOSIS — K40.90 UNILATERAL INGUINAL HERNIA, W/OUT OBSTRUCTION OR GANGRENE, NOT SPECIFIED AS RECURRENT: ICD-10-CM

## 2025-03-24 PROCEDURE — 99213 OFFICE O/P EST LOW 20 MIN: CPT

## (undated) DEVICE — WARMING BLANKET UPPER ADULT

## (undated) DEVICE — DRAPE LAPAROTOMY W POUCHES

## (undated) DEVICE — SUT SURGIPRO 2-0 30" GS-22

## (undated) DEVICE — GLV 7 PROTEXIS (WHITE)

## (undated) DEVICE — DRSG CURITY GAUZE SPONGE 4 X 4" 12-PLY

## (undated) DEVICE — DRAPE LIGHT HANDLE COVER (BLUE)

## (undated) DEVICE — PREP CHLORAPREP HI-LITE ORANGE 26ML

## (undated) DEVICE — NDL HYPO SAFE 25G X 1.5" (ORANGE)

## (undated) DEVICE — ELCTR GROUNDING PAD ADULT COVIDIEN

## (undated) DEVICE — SUT POLYSORB 3-0 30" V-20 UNDYED

## (undated) DEVICE — GLV 7.5 PROTEXIS (WHITE)

## (undated) DEVICE — SOL IRR POUR NS 0.9% 1500ML

## (undated) DEVICE — SUT POLYSORB 2-0 18" TIES UNDYED

## (undated) DEVICE — VENODYNE/SCD SLEEVE CALF MEDIUM

## (undated) DEVICE — SUT POLYSORB 2-0 30" V-20 UNDYED

## (undated) DEVICE — DRSG TEGADERM 4X4.75"

## (undated) DEVICE — DRSG MASTISOL

## (undated) DEVICE — SUT POLYSORB 4-0 27" P-12 UNDYED

## (undated) DEVICE — FOR-ESU VALLEYLAB T7E14996DX: Type: DURABLE MEDICAL EQUIPMENT

## (undated) DEVICE — PACK MINOR NO DRAPE

## (undated) DEVICE — DRAIN PENROSE 5/8" X 18" LATEX